# Patient Record
Sex: MALE | Race: WHITE | NOT HISPANIC OR LATINO | URBAN - METROPOLITAN AREA
[De-identification: names, ages, dates, MRNs, and addresses within clinical notes are randomized per-mention and may not be internally consistent; named-entity substitution may affect disease eponyms.]

---

## 2023-09-20 ENCOUNTER — INPATIENT (INPATIENT)
Facility: HOSPITAL | Age: 38
LOS: 0 days | Discharge: HOME | DRG: 343 | End: 2023-09-21
Attending: SURGERY | Admitting: SURGERY
Payer: SELF-PAY

## 2023-09-20 ENCOUNTER — TRANSCRIPTION ENCOUNTER (OUTPATIENT)
Age: 38
End: 2023-09-20

## 2023-09-20 VITALS
OXYGEN SATURATION: 96 % | HEART RATE: 105 BPM | SYSTOLIC BLOOD PRESSURE: 109 MMHG | TEMPERATURE: 98 F | WEIGHT: 163.8 LBS | RESPIRATION RATE: 18 BRPM | HEIGHT: 64 IN | DIASTOLIC BLOOD PRESSURE: 72 MMHG

## 2023-09-20 LAB
BASOPHILS # BLD AUTO: 0.08 K/UL — SIGNIFICANT CHANGE UP (ref 0–0.2)
BASOPHILS NFR BLD AUTO: 0.4 % — SIGNIFICANT CHANGE UP (ref 0–2)
EOSINOPHIL # BLD AUTO: 0.01 K/UL — SIGNIFICANT CHANGE UP (ref 0–0.5)
EOSINOPHIL NFR BLD AUTO: 0 % — SIGNIFICANT CHANGE UP (ref 0–6)
HCT VFR BLD CALC: 45.9 % — SIGNIFICANT CHANGE UP (ref 39–50)
HGB BLD-MCNC: 15.7 G/DL — SIGNIFICANT CHANGE UP (ref 13–17)
IMM GRANULOCYTES NFR BLD AUTO: 0.2 % — SIGNIFICANT CHANGE UP (ref 0–0.9)
LYMPHOCYTES # BLD AUTO: 1.7 K/UL — SIGNIFICANT CHANGE UP (ref 1–3.3)
LYMPHOCYTES # BLD AUTO: 8.5 % — LOW (ref 13–44)
MCHC RBC-ENTMCNC: 29.5 PG — SIGNIFICANT CHANGE UP (ref 27–34)
MCHC RBC-ENTMCNC: 34.2 GM/DL — SIGNIFICANT CHANGE UP (ref 32–36)
MCV RBC AUTO: 86.3 FL — SIGNIFICANT CHANGE UP (ref 80–100)
MONOCYTES # BLD AUTO: 1.12 K/UL — HIGH (ref 0–0.9)
MONOCYTES NFR BLD AUTO: 5.6 % — SIGNIFICANT CHANGE UP (ref 2–14)
NEUTROPHILS # BLD AUTO: 17.11 K/UL — HIGH (ref 1.8–7.4)
NEUTROPHILS NFR BLD AUTO: 85.3 % — HIGH (ref 43–77)
PLATELET # BLD AUTO: 327 K/UL — SIGNIFICANT CHANGE UP (ref 150–400)
RBC # BLD: 5.32 M/UL — SIGNIFICANT CHANGE UP (ref 4.2–5.8)
RBC # FLD: 13 % — SIGNIFICANT CHANGE UP (ref 10.3–14.5)
WBC # BLD: 20.07 K/UL — HIGH (ref 3.8–10.5)
WBC # FLD AUTO: 20.07 K/UL — HIGH (ref 3.8–10.5)

## 2023-09-20 PROCEDURE — 99285 EMERGENCY DEPT VISIT HI MDM: CPT

## 2023-09-20 RX ORDER — KETOROLAC TROMETHAMINE 30 MG/ML
15 SYRINGE (ML) INJECTION ONCE
Refills: 0 | Status: DISCONTINUED | OUTPATIENT
Start: 2023-09-20 | End: 2023-09-20

## 2023-09-20 RX ORDER — SODIUM CHLORIDE 9 MG/ML
1000 INJECTION INTRAMUSCULAR; INTRAVENOUS; SUBCUTANEOUS ONCE
Refills: 0 | Status: COMPLETED | OUTPATIENT
Start: 2023-09-20 | End: 2023-09-20

## 2023-09-20 RX ADMIN — Medication 15 MILLIGRAM(S): at 23:29

## 2023-09-20 RX ADMIN — SODIUM CHLORIDE 1000 MILLILITER(S): 9 INJECTION INTRAMUSCULAR; INTRAVENOUS; SUBCUTANEOUS at 23:29

## 2023-09-20 NOTE — ED ADULT TRIAGE NOTE - CHIEF COMPLAINT QUOTE
Patient presents to ED with c/o abdominal pain since yesterday.  Denies N/V/D  Took medication for pain, unable to recall name.

## 2023-09-20 NOTE — ED STATDOCS - NS ED SCRIBE STATEMENT
Attending
-You were seen for lightheadedness and decreased appetite.  -Follow up with your cardiologist in 24-48 hours.   -A copy of resulted labs, imaging, and findings have been provided to you.   -As we discussed, please return to the Emergency Department if you experience any new/worsening symptoms, including, but are not limited to: unrelenting nausea, vomiting, fever, chills, chest pain, shortness of breath, dizziness, worsening abdominal pain, worsening back pain, syncope, blood in stool, headache that does not resolve, numbness or tingling, loss of sensation, loss of motor function, or any other concerning symptoms.  -If you have issues obtaining follow up, please call: [phone] to obtain a doctor or specialist who takes your insurance in your area.  You may call [phone] to make an appointment with the internal medicine clinic.

## 2023-09-20 NOTE — ED ADULT NURSE NOTE - ISOLATION TYPE:
Nick Menjivar - Telemetry [facility] Medicine  Discharge Summary      Patient Name: Cosme Lewis  MRN: [id number]  MARCY: [id number]  Patient Class: IP- Inpatient  Admission Date: 3/10/2023  Hospital Length of Stay: 3 days  Discharge Date and Time:  03/15/2023 11:24 AM  Attending Physician: Robbie Smith MD   Discharging Provider: Cecilia Dorman MD  Primary Care Provider: Primary Doctor [facility] Medicine Team: [facility] MED 5 Cecilia Dorman MD  Primary Care Team: [facility] 5    HPI:   Cosme Lewis is a 59 y.o. with CKD on HD MWF, DM1, Renovascular HTN, Chronic respiratory failure (on 3L of home O2), HFpEF, and Mild malnutrition who presents to the ED for vomiting and eye swelling. He began feeling nauseated with SOB, LE swelling, abdominal swelling, and facial swelling 3 days ago. He reports blurry vision associated with his eye swelling and discomfort. He has also had dizziness since the onset of symptoms. He reports vomiting twice today but denies blood in his vomit. Denies fever, chills, diarrhea, cough, dysuria, and chest pain. He still produces urine and his last complete session of HD was on 3/8 as he missed HD today due to feeling ill.       Of note, patient recently hospitalized in the MICU here and discharged on 3/7 to [facility] for hypertensive emergency and acute hypoxic respiratory failure.    In the ED, patient arrived afebrile and HDS satting well on RA. Labs remarkable for anemia 11 at baseline, elevated alk phos 261, normal lactate, and normal lipase. CT of the orbits demonstrated diffuse facial edema. CT A/P demonstrated pleural effusions, pulmonary edema, anasarca, and ascites.     Patient admitted to hospital medicine.       Hospital Course:   Patient admitted for volume overload 2/2 missed HD session and orbital swelling. Ophthalmology consulted, is not concerned for cellulitis at this time as imaging and clinical picture more consistent with edema. Nephrology consulted and  assisting with HD coordination. On 3/11, patient noted to have oozing from AVF on LUE with distal arm edema. Vascular surgery consulted, recommended holding eliquis and getting US of AVF with further evaluation to be done on 3/13. HD was performed on 3/11 with 3.5L removed, 2L removed on 3/14. Patient developed acute hypoxic respiratory failure on 3/12 with an increased O2 requirement to 12L. CTA negative for PE but with GGO suggestive of volume overload. Pt noted to have vomitus w c/f possible aspiration PNA vs pneumonitis and was initiated on ceftriaxine/doxycycline. Eliquis resumed. Oxygen requirements continuing to downtrend near pts baseline. On 3/15, patient satting 93%+ on home O2 regiment of 3-5L NC. Patient medically ready for discharge back to [facility] with close PCP follow up and a supply of doxycycline to complete a 5 day course of therapy.        Interval History: NAEON. Patient complains of hunger again this am prior to arrival of breakfast tray. Patient allowed a second breakfast tray. Satting >93% on 5L NC. Patient medically ready for discharge back to nursing [facility].     Review of Systems   Constitutional:  Negative for chills and fever.   HENT:  Negative for rhinorrhea and sore throat.    Eyes:  Positive for visual disturbance.   Respiratory:  Negative for cough and shortness of breath.    Cardiovascular:  Negative for chest pain.   Gastrointestinal:  Negative for abdominal pain, blood in stool and diarrhea.   Genitourinary:  Negative for dysuria and hematuria.   Neurological:  Negative for light-headedness.   Psychiatric/Behavioral:  Negative for confusion and decreased concentration.    Objective:     Vital Signs (Most Recent):  Temp: 97.6 °F (36.4 °C) (03/15/23 0755)  Pulse: 66 (03/15/23 1055)  Resp: 19 (03/15/23 1055)  BP: (!) 170/92 (03/15/23 0755)  SpO2: 97 % (03/15/23 1055)   Vital Signs (24h Range):  Temp:  [32 °F (0 °C)-99 °F (37.2 °C)] 97.6 °F (36.4 °C)  Pulse:  [58-69]  66  Resp:  [17-20] 19  SpO2:  [90 %-100 %] 97 %  BP: (136-185)/(62-96) 170/92     Weight: 70.4 kg (155 lb 3.3 oz)  Body mass index is 22.92 kg/m².    Intake/Output Summary (Last 24 hours) at 3/15/2023 1116  Last data filed at 3/14/2023 1830  Gross per 24 hour   Intake --   Output 2600 ml   Net -2600 ml        Physical Exam  Vitals and nursing note reviewed.   Constitutional:       General: He is not in acute distress.     Appearance: Normal appearance. He is ill-appearing (chronically). He is not diaphoretic.   HENT:      Head: Normocephalic and atraumatic.      Right Ear: External ear normal.      Left Ear: External ear normal.      Nose:      Comments: NC in place     Mouth/Throat:      Mouth: Mucous membranes are moist.   Eyes:      General: No scleral icterus.        Right eye: No discharge.         Left eye: No discharge.      Comments: Bilateral periorbital and orbital edema, R>L (improved)  Left eye with conjunctival injection   Cardiovascular:      Rate and Rhythm: Normal rate and regular rhythm.      Heart sounds: Normal heart sounds. No murmur heard.  Pulmonary:      Effort: No respiratory distress.      Breath sounds: No rales.   Abdominal:      General: There is distension.      Palpations: Abdomen is soft. There is no mass.      Tenderness: There is no abdominal tenderness.   Musculoskeletal:         General: No swelling or deformity. Normal range of motion.      Cervical back: Normal range of motion and neck supple.      Right lower leg: No edema.      Left lower leg: No edema.   Skin:     General: Skin is warm.      Coloration: Skin is not jaundiced.      Findings: No bruising.      Comments: AVF on LUE enlarged/bulging with resolution of distal arm edema   Neurological:      Mental Status: He is alert and oriented to person, place, and time.   Psychiatric:         Mood and Affect: Mood normal.         Behavior: Behavior normal.         Thought Content: Thought content normal.       Significant Labs:  All pertinent labs within the past 24 hours have been reviewed.    Significant Imaging: I have reviewed all pertinent imaging results/findings within the past 24 hours.        Goals of Care Treatment Preferences:  Code Status: Full Code      Consults:   Consults (From admission, onward)        Status Ordering Provider     Inpatient consult to Vascular Surgery  Once        Provider:  (Not yet assigned)    Completed UNISADRIANA     Inpatient consult to Ophthalmology  Once        Provider:  (Not yet assigned)    Completed LLOYD FINE     Inpatient consult to Nephrology  Once        Provider:  (Not yet assigned)    Completed SESSIONSLLOYD          No new Assessment & Plan notes have been filed under this hospital service since the last note was generated.  Service: Hospital Medicine    Final Active Diagnoses:    Diagnosis Date Noted POA    PRINCIPAL PROBLEM:  Acute on chronic respiratory failure with hypoxia [J96.21] 10/12/2022 Yes    COPD (chronic obstructive pulmonary disease) [J44.9] 03/15/2023 Yes    Leukopenia [D72.819] 03/11/2023 Unknown    AVF (arteriovenous fistula) [I77.0] 03/11/2023 Unknown    Orbital edema [H05.229] 03/10/2023 Unknown    Type 2 diabetes mellitus with hyperglycemia, with long-term current use of insulin [E11.65, Z79.4] 03/07/2023 Not Applicable     Chronic    Pulmonary hypertension [I27.20] 12/19/2022 Yes     Chronic    Anemia in ESRD (end-stage renal disease) [N18.6, D63.1] 11/18/2022 Yes     Chronic    HLD (hyperlipidemia) [E78.5] 11/18/2022 Yes     Chronic    Renovascular hypertension [I15.0] 11/18/2022 Yes     Chronic    Multiple subsegmental pulmonary emboli without acute cor pulmonale [I26.94] 10/13/2022 Yes     Chronic    Chronic diastolic heart failure [I50.32] 10/12/2022 Yes     Chronic    Chronic kidney disease-mineral and bone disorder [N18.9, E83.9, M89.9] 10/12/2022 Yes    Chronic hypoxemic respiratory failure [J96.11] 10/12/2022 Yes     Chronic     ESRD (end stage renal disease) [N18.6]  Unknown      Problems Resolved During this Admission:       Discharged Condition: fair    Disposition: retirement Nursing Home    Follow Up:    Patient Instructions:   No discharge procedures on file.    Significant Diagnostic Studies: Labs: All labs within the past 24 hours have been reviewed    Pending Diagnostic Studies:     None         Medications:  Reconciled Home Medications:      Medication List      START taking these medications    doxycycline 100 MG tablet  Commonly known as: VIBRA-TABS  Take 1 tablet (100 mg total) by mouth every 12 (twelve) hours. for 1 day     epoetin xavier 10,000 unit/mL injection  Commonly known as: PROCRIT  Inject 0.7 mLs (7,000 Units total) into the skin every Tues, Thurs, Sat.  Start taking on: March 16, 2023        CHANGE how you take these medications    cetirizine 10 MG tablet  Commonly known as: ZYRTEC  Take 10 mg by mouth daily as needed (itching).  What changed: Another medication with the same name was removed. Continue taking this medication, and follow the directions you see here.     insulin detemir U-100 100 unit/mL (3 mL) Inpn pen  Commonly known as: Levemir FLEXTOUCH  Inject 7 Units into the skin 2 (two) times daily.  What changed: Another medication with the same name was removed. Continue taking this medication, and follow the directions you see here.        CONTINUE taking these medications    acetaminophen 650 MG Tbsr  Commonly known as: TYLENOL  Take 650 mg by mouth every 8 (eight) hours as needed (pain or fever over 100.4).     albuterol 90 mcg/actuation inhaler  Commonly known as: PROVENTIL/VENTOLIN HFA  Inhale 2 puffs into the lungs every 6 (six) hours as needed for Wheezing or Shortness of Breath.     albuterol-ipratropium 2.5 mg-0.5 mg/3 mL nebulizer solution  Commonly known as: DUO-NEB  Take 3 mLs by nebulization every 4 (four) hours while awake. Rescue     apixaban 5 mg Tab  Commonly known as: ELIQUIS  Take 5 mg by  mouth 2 (two) times daily.     aspirin 81 MG EC tablet  Commonly known as: ECOTRIN  Take 81 mg by mouth once daily.     atorvastatin 40 MG tablet  Commonly known as: LIPITOR  Take 1 tablet (40 mg total) by mouth once daily.     carvediloL 3.125 MG tablet  Commonly known as: COREG  Take 2 tablets (6.25 mg total) by mouth 2 (two) times daily.     cloNIDine 0.3 mg/24 hr td ptwk 0.3 mg/24 hr  Commonly known as: CATAPRES  Place 1 patch onto the skin every Saturday.     erythromycin ophthalmic ointment  Commonly known as: ROMYCIN  Place a 1/2 inch ribbon of ointment into the lower eyelid three times a day.     FLUoxetine 40 MG capsule  Take 40 mg by mouth once daily.     fluticasone-salmeterol 250-50 mcg/dose 250-50 mcg/dose diskus inhaler  Commonly known as: ADVAIR  Inhale 1 puff into the lungs 2 (two) times daily.     GLUCAGON EMERGENCY KIT (HUMAN) 1 mg Solr  Generic drug: glucagon  1 mg into the muscle as needed if CBG < 70     hydrALAZINE 100 MG tablet  Commonly known as: APRESOLINE  Take 1 tablet (100 mg total) by mouth every 8 (eight) hours.     HYDROPHILIC CREAM TOP  Apply liberal amount to affected area twice daily for dry skin     insulin lispro 100 unit/mL injection  Commonly known as: HumaLOG U-100 Insulin  Inject 5 Units into the skin 3 (three) times daily before meals.     isosorbide mononitrate 30 MG 24 hr tablet  Commonly known as: IMDUR  Take 30 mg by mouth 2 (two) times daily.     melatonin 3 mg Tbdl  Take 9 mg by mouth nightly as needed (sleep).     NEPRO CARB STEADY ORAL  Give 1 carton by mouth with each meal.     NIFEdipine 90 MG (OSM) 24 hr tablet  Commonly known as: PROCARDIA-XL  Take 1 tablet (90 mg total) by mouth once daily.     sodium chloride 0.65 % nasal spray  Commonly known as: OCEAN  2 sprays by Nasal route every 4 (four) hours as needed for Congestion.     tiotropium bromide 2.5 mcg/actuation inhaler  Commonly known as: SPIRIVA RESPIMAT  Inhale 2 puffs into the lungs Daily.      triamcinolone acetonide 0.025 % Lotn  Apply topically. Apply to the affected area twice daily     vitamin renal formula (B-complex-vitamin c-folic acid) 1 mg Cap  Commonly known as: NEPHROCAP  Take 1 capsule by mouth once daily.        STOP taking these medications    insulin aspart U-100 100 unit/mL (3 mL) Inpn pen  Commonly known as: NovoLOG            Indwelling Lines/Drains at time of discharge:   Lines/Drains/Airways     Drain  Duration                Hemodialysis AV Fistula Left upper arm -- days                Time spent on the discharge of patient: 60 minutes         Cecilia Dorman MD  Department of Hospital Medicine  [facility] - Telemetry Stepdown   None

## 2023-09-20 NOTE — ED STATDOCS - CLINICAL SUMMARY MEDICAL DECISION MAKING FREE TEXT BOX
37 y/o male with no pertinent PMHx presents to the ED c/o worsening right sided abdominal pain since yesterday. Rule out intraabdominal pathology. Will do labs, CT, and pain control. 39 y/o male with no pertinent PMHx presents to the ED c/o worsening right sided abdominal pain since yesterday. Rule out intraabdominal pathology. Will do labs, CT, and pain control.    Pristupa: Labs significant for leukocytosis. CT with acute appendicitis. Pt evaluated by surgery team. 39 y/o male with no pertinent PMHx presents to the ED c/o worsening right sided abdominal pain since yesterday. Rule out intraabdominal pathology. Will do labs, CT, and pain control.    Pristupa: Labs significant for leukocytosis. CT with acute appendicitis. Started on abx, Pt evaluated by surgery team. Admit under dr diaz.

## 2023-09-20 NOTE — ED ADULT TRIAGE NOTE - SOURCE OF INFORMATION
3 day Sleep therapy management telephone visit.    Diagnostic AHI: 17.8  PSG    Confirmed with patient at time of call- N/A Patient is still interested in STM service       Unable to leave message patient mailbox full.    Replacement device: Yes  STM ordered by provider: Yes    Objective data     Order Settings for PAP  CPAP min 11    CPAP max 20        Device settings from machine CPAP min 12.0     CPAP max 20.0      EPR Setting THREE     Assessment: Nightly usage over four hours      Action plan: Patient to have 14 day STM visit. Patient has a follow up visit scheduled:   no     Total time spent on accessing and  interpreting remote patient PAP therapy data  10 minutes    Total time spent counseling, coaching  and reviewing PAP therapy data with patient  1 minutes    [id number] no                                 Patient

## 2023-09-20 NOTE — ED ADULT NURSE NOTE - OBJECTIVE STATEMENT
pt A & Ox4 c/o medial abdominal pain. Pt denies N/V/D, chest pain, SOB. No edema, JVD noted. respirations even and unlabored. Abdomen soft and non-distended. IV established, medication administered as ordered. bed locked and in lowest position.

## 2023-09-20 NOTE — ED STATDOCS - OBJECTIVE STATEMENT
39 y/o male with no pertinent PMHx presents to the ED c/o worsening right sided abdominal pain since yesterday. Took unknown mediation without relief. Pt denies fevers/chills, ha, loc, focal neuro deficits, cp/sob/palp, cough, n/v/d, urinary symptoms, recent travel and sick contacts. Denies smoking, but admit to drinking alcohol once every 8 days. No established PMD. No known allergies to medication.

## 2023-09-21 ENCOUNTER — TRANSCRIPTION ENCOUNTER (OUTPATIENT)
Age: 38
End: 2023-09-21

## 2023-09-21 ENCOUNTER — RESULT REVIEW (OUTPATIENT)
Age: 38
End: 2023-09-21

## 2023-09-21 VITALS
OXYGEN SATURATION: 98 % | RESPIRATION RATE: 15 BRPM | DIASTOLIC BLOOD PRESSURE: 76 MMHG | HEART RATE: 86 BPM | SYSTOLIC BLOOD PRESSURE: 125 MMHG

## 2023-09-21 DIAGNOSIS — K35.80 UNSPECIFIED ACUTE APPENDICITIS: ICD-10-CM

## 2023-09-21 LAB
ABO RH CONFIRMATION: SIGNIFICANT CHANGE UP
ALBUMIN SERPL ELPH-MCNC: 5.1 G/DL — SIGNIFICANT CHANGE UP (ref 3.3–5.2)
ALP SERPL-CCNC: 121 U/L — HIGH (ref 40–120)
ALT FLD-CCNC: 29 U/L — SIGNIFICANT CHANGE UP
ANION GAP SERPL CALC-SCNC: 13 MMOL/L — SIGNIFICANT CHANGE UP (ref 5–17)
APTT BLD: 33.3 SEC — SIGNIFICANT CHANGE UP (ref 24.5–35.6)
AST SERPL-CCNC: 28 U/L — SIGNIFICANT CHANGE UP
BILIRUB SERPL-MCNC: 0.6 MG/DL — SIGNIFICANT CHANGE UP (ref 0.4–2)
BLD GP AB SCN SERPL QL: SIGNIFICANT CHANGE UP
BUN SERPL-MCNC: 15.9 MG/DL — SIGNIFICANT CHANGE UP (ref 8–20)
CALCIUM SERPL-MCNC: 9.2 MG/DL — SIGNIFICANT CHANGE UP (ref 8.4–10.5)
CHLORIDE SERPL-SCNC: 94 MMOL/L — LOW (ref 96–108)
CO2 SERPL-SCNC: 29 MMOL/L — SIGNIFICANT CHANGE UP (ref 22–29)
CREAT SERPL-MCNC: 0.88 MG/DL — SIGNIFICANT CHANGE UP (ref 0.5–1.3)
EGFR: 113 ML/MIN/1.73M2 — SIGNIFICANT CHANGE UP
GLUCOSE SERPL-MCNC: 114 MG/DL — HIGH (ref 70–99)
INR BLD: 1.08 RATIO — SIGNIFICANT CHANGE UP (ref 0.85–1.18)
LIDOCAIN IGE QN: 15 U/L — LOW (ref 22–51)
POTASSIUM SERPL-MCNC: 3.5 MMOL/L — SIGNIFICANT CHANGE UP (ref 3.5–5.3)
POTASSIUM SERPL-SCNC: 3.5 MMOL/L — SIGNIFICANT CHANGE UP (ref 3.5–5.3)
PROT SERPL-MCNC: 8.1 G/DL — SIGNIFICANT CHANGE UP (ref 6.6–8.7)
PROTHROM AB SERPL-ACNC: 12 SEC — SIGNIFICANT CHANGE UP (ref 9.5–13)
SODIUM SERPL-SCNC: 136 MMOL/L — SIGNIFICANT CHANGE UP (ref 135–145)

## 2023-09-21 PROCEDURE — 99284 EMERGENCY DEPT VISIT MOD MDM: CPT

## 2023-09-21 PROCEDURE — 85730 THROMBOPLASTIN TIME PARTIAL: CPT

## 2023-09-21 PROCEDURE — 83690 ASSAY OF LIPASE: CPT

## 2023-09-21 PROCEDURE — 36415 COLL VENOUS BLD VENIPUNCTURE: CPT

## 2023-09-21 PROCEDURE — T1013: CPT

## 2023-09-21 PROCEDURE — 86850 RBC ANTIBODY SCREEN: CPT

## 2023-09-21 PROCEDURE — 74177 CT ABD & PELVIS W/CONTRAST: CPT | Mod: 26,MA

## 2023-09-21 PROCEDURE — C1889: CPT

## 2023-09-21 PROCEDURE — 44970 LAPAROSCOPY APPENDECTOMY: CPT

## 2023-09-21 PROCEDURE — 99285 EMERGENCY DEPT VISIT HI MDM: CPT

## 2023-09-21 PROCEDURE — 88304 TISSUE EXAM BY PATHOLOGIST: CPT

## 2023-09-21 PROCEDURE — 86900 BLOOD TYPING SEROLOGIC ABO: CPT

## 2023-09-21 PROCEDURE — 74177 CT ABD & PELVIS W/CONTRAST: CPT | Mod: MA

## 2023-09-21 PROCEDURE — 96374 THER/PROPH/DIAG INJ IV PUSH: CPT | Mod: XU

## 2023-09-21 PROCEDURE — 96361 HYDRATE IV INFUSION ADD-ON: CPT

## 2023-09-21 PROCEDURE — 85610 PROTHROMBIN TIME: CPT

## 2023-09-21 PROCEDURE — 88304 TISSUE EXAM BY PATHOLOGIST: CPT | Mod: 26

## 2023-09-21 PROCEDURE — 80053 COMPREHEN METABOLIC PANEL: CPT

## 2023-09-21 PROCEDURE — 86901 BLOOD TYPING SEROLOGIC RH(D): CPT

## 2023-09-21 PROCEDURE — 85025 COMPLETE CBC W/AUTO DIFF WBC: CPT

## 2023-09-21 DEVICE — STAPLER COVIDIEN TRI-STAPLE 45MM PURPLE RELOAD: Type: IMPLANTABLE DEVICE | Status: FUNCTIONAL

## 2023-09-21 RX ORDER — ONDANSETRON 8 MG/1
4 TABLET, FILM COATED ORAL EVERY 6 HOURS
Refills: 0 | Status: DISCONTINUED | OUTPATIENT
Start: 2023-09-21 | End: 2023-09-21

## 2023-09-21 RX ORDER — POTASSIUM CHLORIDE 20 MEQ
40 PACKET (EA) ORAL ONCE
Refills: 0 | Status: DISCONTINUED | OUTPATIENT
Start: 2023-09-21 | End: 2023-09-21

## 2023-09-21 RX ORDER — SODIUM CHLORIDE 9 MG/ML
1000 INJECTION, SOLUTION INTRAVENOUS
Refills: 0 | Status: DISCONTINUED | OUTPATIENT
Start: 2023-09-21 | End: 2023-09-21

## 2023-09-21 RX ORDER — FENTANYL CITRATE 50 UG/ML
50 INJECTION INTRAVENOUS
Refills: 0 | Status: DISCONTINUED | OUTPATIENT
Start: 2023-09-21 | End: 2023-09-21

## 2023-09-21 RX ORDER — ENOXAPARIN SODIUM 100 MG/ML
40 INJECTION SUBCUTANEOUS EVERY 24 HOURS
Refills: 0 | Status: DISCONTINUED | OUTPATIENT
Start: 2023-09-21 | End: 2023-09-21

## 2023-09-21 RX ORDER — CEFOTETAN DISODIUM 1 G
2 VIAL (EA) INJECTION EVERY 12 HOURS
Refills: 0 | Status: DISCONTINUED | OUTPATIENT
Start: 2023-09-21 | End: 2023-09-21

## 2023-09-21 RX ORDER — CEFOTETAN DISODIUM 1 G
2 VIAL (EA) INJECTION ONCE
Refills: 0 | Status: COMPLETED | OUTPATIENT
Start: 2023-09-21 | End: 2023-09-21

## 2023-09-21 RX ORDER — IBUPROFEN 200 MG
600 TABLET ORAL EVERY 6 HOURS
Refills: 0 | Status: DISCONTINUED | OUTPATIENT
Start: 2023-09-21 | End: 2023-09-21

## 2023-09-21 RX ORDER — ONDANSETRON 8 MG/1
4 TABLET, FILM COATED ORAL ONCE
Refills: 0 | Status: DISCONTINUED | OUTPATIENT
Start: 2023-09-21 | End: 2023-09-21

## 2023-09-21 RX ORDER — ACETAMINOPHEN 500 MG
975 TABLET ORAL EVERY 6 HOURS
Refills: 0 | Status: DISCONTINUED | OUTPATIENT
Start: 2023-09-21 | End: 2023-09-21

## 2023-09-21 RX ADMIN — SODIUM CHLORIDE 1000 MILLILITER(S): 9 INJECTION INTRAMUSCULAR; INTRAVENOUS; SUBCUTANEOUS at 01:45

## 2023-09-21 RX ADMIN — Medication 100 GRAM(S): at 05:29

## 2023-09-21 RX ADMIN — Medication 15 MILLIGRAM(S): at 04:36

## 2023-09-21 NOTE — H&P ADULT - ATTENDING COMMENTS
Patient seen and examined at bedside. Abd soft, minimal TTP RLQ, ND. Imaging and labs reviewed, leukocytosis present, large inflammed appendix present consistent with acute appendicitis. To OR today for lap appy. Npo, IVF, IV abx

## 2023-09-21 NOTE — H&P ADULT - HISTORY OF PRESENT ILLNESS
37 yo M without PMH/PSH presents to the ED complaining of 1 day of vague then RLQ abdominal pain associated with nausea and anorexia. Patient tried OTC medications without relief and decided to present to the ED.  Patient has never had a similar episode and denies urinary symptoms.  Denies fevers, chills, emesis.  Denies chest pain, dyspnea.  Denies constipation, diarrhea. Denies headaches, dizziness, changes in vision.

## 2023-09-21 NOTE — ED ADULT NURSE REASSESSMENT NOTE - NS ED NURSE REASSESS COMMENT FT1
pt A & Ox4 offers no complaints at this time. Denies pain. First round of IV antibiotics hung.
pt A & ox4, offers no complaints at this time. Awaiting transport to CT. Able to make needs known.
pt A & Ox4, resting comfortably on stretcher. VSS, offers no complaints. Awaiting Surg consult.

## 2023-09-21 NOTE — ASU DISCHARGE PLAN (ADULT/PEDIATRIC) - CARE PROVIDER_API CALL
Luis Johns  Surgery  [address] [place] [address], [address]  [place] [address]  Phone: [phone]  Fax: [phone]  Follow Up Time:

## 2023-09-26 LAB — SURGICAL PATHOLOGY STUDY: SIGNIFICANT CHANGE UP

## 2023-09-27 PROBLEM — Z78.9 OTHER SPECIFIED HEALTH STATUS: Chronic | Status: ACTIVE | Noted: 2023-09-21

## 2023-10-02 PROBLEM — Z00.00 ENCOUNTER FOR PREVENTIVE HEALTH EXAMINATION: Status: ACTIVE | Noted: 2023-10-02

## 2023-10-05 ENCOUNTER — APPOINTMENT (OUTPATIENT)
Dept: TRAUMA SURGERY | Facility: CLINIC | Age: 38
End: 2023-10-05
Payer: SUBSIDIZED

## 2023-10-05 VITALS
RESPIRATION RATE: 16 BRPM | DIASTOLIC BLOOD PRESSURE: 70 MMHG | TEMPERATURE: 98.7 F | WEIGHT: 155 LBS | BODY MASS INDEX: 26.46 KG/M2 | HEIGHT: 64 IN | HEART RATE: 74 BPM | SYSTOLIC BLOOD PRESSURE: 121 MMHG | OXYGEN SATURATION: 98 %

## 2023-10-05 VITALS
HEART RATE: 79 BPM | BODY MASS INDEX: 20.3 KG/M2 | RESPIRATION RATE: 16 BRPM | SYSTOLIC BLOOD PRESSURE: 161 MMHG | OXYGEN SATURATION: 96 % | DIASTOLIC BLOOD PRESSURE: 81 MMHG | TEMPERATURE: 97.6 F | WEIGHT: 116 LBS | HEIGHT: 63.5 IN

## 2023-10-05 DIAGNOSIS — K35.31 ACUTE APPENDICITIS W/ LOCALIZED PERITONITIS AND GANGRENE,W/O PERFORATION: ICD-10-CM

## 2023-10-05 DIAGNOSIS — Z87.891 PERSONAL HISTORY OF NICOTINE DEPENDENCE: ICD-10-CM

## 2023-10-05 DIAGNOSIS — Z78.9 OTHER SPECIFIED HEALTH STATUS: ICD-10-CM

## 2023-10-05 PROCEDURE — 99024 POSTOP FOLLOW-UP VISIT: CPT

## 2024-02-27 NOTE — ED STATDOCS - NSCAREINITIATED _GEN_ER
Brigid Mclean(Attending) Cathy is a 41 year old who is being evaluated via a billable video visit.      How would you like to obtain your AVS? MyChart  If the video visit is dropped, the invitation should be resent by: Text to cell phone: [phone]  Will anyone else be joining your video visit? No          Assessment & Plan     Viral conjunctivitis, both eyes  Weston is a 41-year-old female who presents today with bilateral conjunctivitis starting yesterday.  Patient is a contact lens wearer, is able to keep her eye spontaneously open, although does have foreign body sensation is not bad enough where she cannot keep her eye open.  As we are talking her eye is open, has mild redness, does not seem to be bothered by the light in her room.  Believe keratitis to be very unlikely.  Most likely is viral conjunctivitis, also considered bacterial conjunctivitis but believe this to be less likely.  Patient was using polymyxin B on her own, has some left from when her child was seen last week and this did provide some relief.  Will give patient erythromycin ointment, can use for 5 to 7 days.  Went over red flag symptoms, if having copious amounts of pus and discharge, photophobia where is difficult to keep eye open and lit room, difficulty keeping her eye open due to pain, changes in vision.  She is aware that is very contagious, it spreads by direct contact or contact with contaminated objects, she should not share handkerchief styles tissues cosmetics linens or eating utensils with others.  - erythromycin (ROMYCIN) 5 MG/GM ophthalmic ointment  Dispense: 1 g; Refill: 0    Subconjunctival hemorrhage of right eye  Has subconjunctival hemorrhage of right eye on lateral side, occurred spontaneously without trauma.  This will likely self resolve over the next 2 to 3 weeks without any treatment.    Follow-up in 5 days if not improving.  Go to emergency room if any red flag symptoms occur.          Subjective   Cathy is a 41 year old, presenting for  the following health issues:  Conjunctivitis (Both eyes, red, puss, painful.)        2/27/2024     1:24 PM   Additional Questions   Roomed by Miranda COOLEY     Conjunctivitis    History of Present Illness       Reason for visit:  Ingrown hair    She eats 0-1 servings of fruits and vegetables daily.She consumes 1 sweetened beverage(s) daily.She exercises with enough effort to increase her heart rate 9 or less minutes per day.  She exercises with enough effort to increase her heart rate 3 or less days per week.   She is taking medications regularly.         Reports that yesterday her eyes became pink and irritated.  She started to get discharge in her eye, when she woke up in the morning.  The eyelids were matted shut with pus this morning.  She is having pain in both eyes.  States that the discharge was white but she was able to wipe it out of her eyes.  She sees it reaccumulating white discharge from time to time and will wipe it out.  Started in the left eye and spread to the right eye.  Her child had pinkeye diagnosed 1 week ago.  Not having headache, photophobia.  Although has mild foreign body sensation does not keep her from keeping the eye open.  No change in vision.      Review of Systems  Constitutional, HEENT, cardiovascular, pulmonary, gi and gu systems are negative, except as otherwise noted.      Objective           Vitals:  No vitals were obtained today due to virtual visit.    Physical Exam   GENERAL: alert and no distress  EYES: eyelids- normal appearance, conjunctiva/corneas- conjunctival injection OU, clear colored discharge present bilateral, and subconjunctival hemorrhage OD, and pupils- normal.   RESP: No audible wheeze, cough, or visible cyanosis.    SKIN: Visible skin clear. No significant rash, abnormal pigmentation or lesions.  NEURO: Cranial nerves grossly intact.  Mentation and speech appropriate for age.  PSYCH: Appropriate affect, tone, and pace of words        Video-Visit Details    Type of  service:  Video Visit   Video Start Time:  1440  Video End Time: 1450    Originating Location (pt. Location): Home    Distant Location (provider location):  On-site  Platform used for Video Visit: Debra  Signed Electronically by: Bryce Colbert MD

## (undated) DEVICE — BLADE SURGICAL #11 CARBON

## (undated) DEVICE — ELCTR GROUNDING PAD ADULT COVIDIEN

## (undated) DEVICE — SUT VICRYL PLUS 4-0 18" PS-2 UNDYED

## (undated) DEVICE — SUT VICRYL 0 27" UR-6

## (undated) DEVICE — WARMING BLANKET UPPER ADULT

## (undated) DEVICE — ENDOCATCH 10MM SPECIMEN POUCH

## (undated) DEVICE — VENODYNE/SCD SLEEVE CALF MEDIUM

## (undated) DEVICE — TROCAR COVIDIEN VERSAONE FIXATION CANNULA 5MM

## (undated) DEVICE — LIGASURE MARYLAND 37CM

## (undated) DEVICE — ELCTR ROCKER SWITCH PENCIL BLUE 10FT

## (undated) DEVICE — PACK GENERAL LAPAROSCOPY

## (undated) DEVICE — TROCAR COVIDIEN VERSAPORT BLADELESS OPTICAL 5MM STANDARD

## (undated) DEVICE — DRAPE 1/2 SHEET 40X57"

## (undated) DEVICE — D HELP - CLEARVIEW CLEARIFY SYSTEM

## (undated) DEVICE — GLV 8 PROTEXIS (BLUE)

## (undated) DEVICE — ELCTR CORD FOOTSWITCH 1PLR LAPSCP 10FT

## (undated) DEVICE — DISSECTOR ENDOSCOPIC KITTNER SINGLE TIP

## (undated) DEVICE — DRSG STERISTRIPS 0.5 X 4"

## (undated) DEVICE — GLV 8 PROTEXIS (WHITE)

## (undated) DEVICE — STAPLER COVIDIEN ENDO GIA STANDARD HANDLE

## (undated) DEVICE — SOL IRR POUR H2O 1000ML

## (undated) DEVICE — TROCAR COVIDIEN VERSAPORT BLADELESS OPTICAL 12MM STANDARD

## (undated) DEVICE — SYR CONTROL LUER LOK 10CC

## (undated) DEVICE — Device

## (undated) DEVICE — SOL IRR POUR NS 0.9% 1000ML

## (undated) DEVICE — DRSG MASTISOL